# Patient Record
(demographics unavailable — no encounter records)

---

## 2025-03-26 NOTE — HISTORY OF PRESENT ILLNESS
[Never] : never [TextBox_4] : 94F with CAD (MI s/p angioplasty in her 60s), CVA + TIAs (age 70s), HTN, HLD, vascular dementia, RLE DVT iso pneumonia ([facility] 2024) on eliquis, who presented to [facility] 12/2024 for pneumonia and b/l effusions likely partly CHF related, and then again March 2025 for Flu A, who presents for post discharge follow up. She lives at the [facility]. At baseline she is able to walk with a walker, feed herself, and get in out of bed/chair on her own.   PCP: Andria Ge ([facility])   December 2024 [facility] admit:  She was brought to the ED due to 3 days of productive cough, fever, and malaise. CXR showed left midperipheral infiltrate. Her BP dropped to 70s, additional boluses of IVF given, ICU consulted but ultimately BPs stabilized. I saw her initially and POCUS showed B lines in multiple lung feels, L small to moderate effusion free flowing. Asked them to do chest PT/mucus clearance and diuresis. Dr. Leon saw her in follow up, b/l effusion which improved with diuresis and L>R could have been mildly parapneumonic but too small to tap. Had SLP eval which they did make some recommendations - pureed diet with thickened liquids but mBS didn't show any aspiration. Dsicharged with plan for CT chest in 6-8 weeks.   March 2025. Flu A positive, CXR with diffuse multifocal infiltrates. She was discharged on symbicort 2 puffs BID.  Put on scopolamine patch!! Discharged on 2L.  Returned same day for lower oxygen repeat CTPA done and negative, showed b/l effusions, discharged again.   Today,  - her daughter Mildred is with her and gives most of the history - recovered well after the December admit, was using her walker and moving around and really was doing well - now she's a bit slower to recover after this admission, not yet walking with the walker and mostly in the wheelchair, PT did come in a few times but now that has stopped  - discharged on 2L oxygen and was told use it only when her head is down for extended periods or lying flat but the nursing home won't change it, they will only just leave it on cont at 2L  - dtr checks O2 when she visits and the lowest she's seen it go off oxygen was 86% with exertion  - no coughing, no wheeze, breathing seems to be ok  - not using inhaler, didn't realize it was prescribed until a week ago  - she's been losing weight with the new diet, wants to be able to give her whatever food she wants and suspects her swallowing is better now  - going to see an ENT through [facility] Dr. Grady

## 2025-03-26 NOTE — HISTORY OF PRESENT ILLNESS
[Never] : never [TextBox_4] : 94F with CAD (MI s/p angioplasty in her 60s), CVA + TIAs (age 70s), HTN, HLD, vascular dementia, RLE DVT iso pneumonia ([facility] 2024) on eliquis, who presented to [facility] 12/2024 for pneumonia and b/l effusions likely partly CHF related, and then again March 2025 for Flu A, who presents for post discharge follow up. She lives at the [facility]. At baseline she is able to walk with a walker, feed herself, and get in out of bed/chair on her own.   PCP: Andria Ge ([facility])   December 2024 [facility] admit:  She was brought to the ED due to 3 days of productive cough, fever, and malaise. CXR showed left midperipheral infiltrate. Her BP dropped to 70s, additional boluses of IVF given, ICU consulted but ultimately BPs stabilized. I saw her initially and POCUS showed B lines in multiple lung feels, L small to moderate effusion free flowing. Asked them to do chest PT/mucus clearance and diuresis. Dr. Leon saw her in follow up, b/l effusion which improved with diuresis and L>R could have been mildly parapneumonic but too small to tap. Had SLP eval which they did make some recommendations - pureed diet with thickened liquids but mBS didn't show any aspiration. Dsicharged with plan for CT chest in 6-8 weeks.   March 2025. Flu A positive, CXR with diffuse multifocal infiltrates. She was discharged on symbicort 2 puffs BID.  Put on scopolamine patch!! Discharged on 2L.  Returned same day for lower oxygen repeat CTPA done and negative, showed b/l effusions, discharged again.   Today,  - her daughter Mildred is with her and gives most of the history - recovered well after the December admit, was using her walker and moving around and really was doing well - now she's a bit slower to recover after this admission, not yet walking with the walker and mostly in the wheelchair, PT did come in a few times but now that has stopped  - discharged on 2L oxygen and was told use it only when her head is down for extended periods or lying flat but the nursing home won't change it, they will only just leave it on cont at 2L  - dtr checks O2 when she visits and the lowest she's seen it go off oxygen was 86% with exertion  - no coughing, no wheeze, breathing seems to be ok  - not using inhaler, didn't realize it was prescribed until a week ago  - she's been losing weight with the new diet, wants to be able to give her whatever food she wants and suspects her swallowing is better now  - going to see an ENT through [facility] Dr. Grady

## 2025-03-26 NOTE — PHYSICAL EXAM
[No Acute Distress] : no acute distress [Normal Oropharynx] : normal oropharynx [Normal Appearance] : normal appearance [No Neck Mass] : no neck mass [Normal Rate/Rhythm] : normal rate/rhythm [Normal S1, S2] : normal s1, s2 [No Murmurs] : no murmurs [No Resp Distress] : no resp distress [Clear to Auscultation Bilaterally] : clear to auscultation bilaterally [Kyphosis] : kyphosis [Benign] : benign [Normal Gait] : normal gait [No Clubbing] : no clubbing [No Cyanosis] : no cyanosis [FROM] : FROM [Normal Color/ Pigmentation] : normal color/ pigmentation [No Focal Deficits] : no focal deficits [Normal Affect] : normal affect [TextBox_105] : very trace edema b/l in LEs [TextBox_140] : oriented to herself and her daughter

## 2025-05-16 NOTE — HISTORY OF PRESENT ILLNESS
[Family Member] : family member [Home] : at home, [unfilled] , at the time of the visit. [Medical Office: ([facility])___] : at the medical office located in  [Telephone (audio)] : This telephonic visit was provided via audio only technology. [FreeTextEntry3] : Daughter Jana [FreeTextEntry1] : Medication consultation [de-identified] : Nov 2023 patient was hospitalized at [facility], diagnosed with right thigh DVT, pneumonia, malnourishment.  Previously had been on aggrenox but was switched to Eliquis at that time.  Has remained on Eliquis for about 1.5 years.  Patient having more skin tears, issues with wound healing.  Family would like to see if Eliquis is still needed.

## 2025-05-16 NOTE — REVIEW OF SYSTEMS
[Easy Bleeding] : easy bleeding [Negative] : Integumentary [de-identified] : Fragile skin, skin tears

## 2025-05-16 NOTE — REVIEW OF SYSTEMS
[Easy Bleeding] : easy bleeding [Negative] : Integumentary [de-identified] : Fragile skin, skin tears

## 2025-05-16 NOTE — HISTORY OF PRESENT ILLNESS
[Family Member] : family member [Home] : at home, [unfilled] , at the time of the visit. [Medical Office: ([facility])___] : at the medical office located in  [Telephone (audio)] : This telephonic visit was provided via audio only technology. [FreeTextEntry3] : Daughter Jana [FreeTextEntry1] : Medication consultation [de-identified] : Nov 2023 patient was hospitalized at [facility], diagnosed with right thigh DVT, pneumonia, malnourishment.  Previously had been on aggrenox but was switched to Eliquis at that time.  Has remained on Eliquis for about 1.5 years.  Patient having more skin tears, issues with wound healing.  Family would like to see if Eliquis is still needed.